# Patient Record
Sex: FEMALE | Race: WHITE | NOT HISPANIC OR LATINO | Employment: OTHER | URBAN - METROPOLITAN AREA
[De-identification: names, ages, dates, MRNs, and addresses within clinical notes are randomized per-mention and may not be internally consistent; named-entity substitution may affect disease eponyms.]

---

## 2023-10-30 ENCOUNTER — HOSPITAL ENCOUNTER (EMERGENCY)
Facility: HOSPITAL | Age: 83
Discharge: HOME | End: 2023-10-30
Attending: EMERGENCY MEDICINE
Payer: MEDICARE

## 2023-10-30 ENCOUNTER — APPOINTMENT (OUTPATIENT)
Dept: RADIOLOGY | Facility: HOSPITAL | Age: 83
End: 2023-10-30
Payer: MEDICARE

## 2023-10-30 VITALS
BODY MASS INDEX: 23.7 KG/M2 | TEMPERATURE: 98.2 F | RESPIRATION RATE: 18 BRPM | WEIGHT: 151 LBS | OXYGEN SATURATION: 94 % | HEIGHT: 67 IN | DIASTOLIC BLOOD PRESSURE: 102 MMHG | SYSTOLIC BLOOD PRESSURE: 149 MMHG | HEART RATE: 91 BPM

## 2023-10-30 DIAGNOSIS — U07.1 COVID: Primary | ICD-10-CM

## 2023-10-30 LAB
ALBUMIN SERPL BCP-MCNC: 3.6 G/DL (ref 3.4–5)
ALP SERPL-CCNC: 74 U/L (ref 33–136)
ALT SERPL W P-5'-P-CCNC: 6 U/L (ref 7–45)
ANION GAP SERPL CALC-SCNC: 13 MMOL/L (ref 10–20)
AST SERPL W P-5'-P-CCNC: 15 U/L (ref 9–39)
BASOPHILS # BLD AUTO: 0.04 X10*3/UL (ref 0–0.1)
BASOPHILS NFR BLD AUTO: 0.7 %
BILIRUB SERPL-MCNC: 1.1 MG/DL (ref 0–1.2)
BUN SERPL-MCNC: 12 MG/DL (ref 6–23)
CALCIUM SERPL-MCNC: 8.8 MG/DL (ref 8.6–10.3)
CHLORIDE SERPL-SCNC: 102 MMOL/L (ref 98–107)
CO2 SERPL-SCNC: 26 MMOL/L (ref 21–32)
CREAT SERPL-MCNC: 0.97 MG/DL (ref 0.5–1.05)
EOSINOPHIL # BLD AUTO: 0.02 X10*3/UL (ref 0–0.4)
EOSINOPHIL NFR BLD AUTO: 0.3 %
ERYTHROCYTE [DISTWIDTH] IN BLOOD BY AUTOMATED COUNT: 14.2 % (ref 11.5–14.5)
FLUAV RNA RESP QL NAA+PROBE: NOT DETECTED
FLUBV RNA RESP QL NAA+PROBE: NOT DETECTED
GFR SERPL CREATININE-BSD FRML MDRD: 57 ML/MIN/1.73M*2
GLUCOSE SERPL-MCNC: 94 MG/DL (ref 74–99)
HCT VFR BLD AUTO: 41.2 % (ref 36–46)
HGB BLD-MCNC: 13 G/DL (ref 12–16)
IMM GRANULOCYTES # BLD AUTO: 0.01 X10*3/UL (ref 0–0.5)
IMM GRANULOCYTES NFR BLD AUTO: 0.2 % (ref 0–0.9)
LACTATE SERPL-SCNC: 1.1 MMOL/L (ref 0.4–2)
LYMPHOCYTES # BLD AUTO: 1.68 X10*3/UL (ref 0.8–3)
LYMPHOCYTES NFR BLD AUTO: 28 %
MCH RBC QN AUTO: 28.6 PG (ref 26–34)
MCHC RBC AUTO-ENTMCNC: 31.6 G/DL (ref 32–36)
MCV RBC AUTO: 91 FL (ref 80–100)
MONOCYTES # BLD AUTO: 0.65 X10*3/UL (ref 0.05–0.8)
MONOCYTES NFR BLD AUTO: 10.8 %
NEUTROPHILS # BLD AUTO: 3.61 X10*3/UL (ref 1.6–5.5)
NEUTROPHILS NFR BLD AUTO: 60 %
NRBC BLD-RTO: 0 /100 WBCS (ref 0–0)
PLATELET # BLD AUTO: 214 X10*3/UL (ref 150–450)
PMV BLD AUTO: 9.1 FL (ref 7.5–11.5)
POTASSIUM SERPL-SCNC: 4.3 MMOL/L (ref 3.5–5.3)
PROT SERPL-MCNC: 8 G/DL (ref 6.4–8.2)
RBC # BLD AUTO: 4.55 X10*6/UL (ref 4–5.2)
SARS-COV-2 RNA RESP QL NAA+PROBE: DETECTED
SODIUM SERPL-SCNC: 137 MMOL/L (ref 136–145)
WBC # BLD AUTO: 6 X10*3/UL (ref 4.4–11.3)

## 2023-10-30 PROCEDURE — 99283 EMERGENCY DEPT VISIT LOW MDM: CPT | Mod: 25

## 2023-10-30 PROCEDURE — 83605 ASSAY OF LACTIC ACID: CPT | Performed by: NURSE PRACTITIONER

## 2023-10-30 PROCEDURE — 36415 COLL VENOUS BLD VENIPUNCTURE: CPT | Performed by: NURSE PRACTITIONER

## 2023-10-30 PROCEDURE — 87636 SARSCOV2 & INF A&B AMP PRB: CPT | Performed by: NURSE PRACTITIONER

## 2023-10-30 PROCEDURE — 85025 COMPLETE CBC W/AUTO DIFF WBC: CPT | Performed by: NURSE PRACTITIONER

## 2023-10-30 PROCEDURE — 80053 COMPREHEN METABOLIC PANEL: CPT | Performed by: NURSE PRACTITIONER

## 2023-10-30 PROCEDURE — 71046 X-RAY EXAM CHEST 2 VIEWS: CPT | Mod: FOREIGN READ | Performed by: RADIOLOGY

## 2023-10-30 PROCEDURE — 71046 X-RAY EXAM CHEST 2 VIEWS: CPT | Mod: FY,FR

## 2023-10-30 PROCEDURE — 99284 EMERGENCY DEPT VISIT MOD MDM: CPT | Mod: 25 | Performed by: EMERGENCY MEDICINE

## 2023-10-30 ASSESSMENT — LIFESTYLE VARIABLES
HAVE PEOPLE ANNOYED YOU BY CRITICIZING YOUR DRINKING: NO
EVER HAD A DRINK FIRST THING IN THE MORNING TO STEADY YOUR NERVES TO GET RID OF A HANGOVER: NO
REASON UNABLE TO ASSESS: NO
EVER FELT BAD OR GUILTY ABOUT YOUR DRINKING: NO
HAVE YOU EVER FELT YOU SHOULD CUT DOWN ON YOUR DRINKING: NO

## 2023-10-30 ASSESSMENT — COLUMBIA-SUICIDE SEVERITY RATING SCALE - C-SSRS
6. HAVE YOU EVER DONE ANYTHING, STARTED TO DO ANYTHING, OR PREPARED TO DO ANYTHING TO END YOUR LIFE?: NO
1. IN THE PAST MONTH, HAVE YOU WISHED YOU WERE DEAD OR WISHED YOU COULD GO TO SLEEP AND NOT WAKE UP?: NO
2. HAVE YOU ACTUALLY HAD ANY THOUGHTS OF KILLING YOURSELF?: NO

## 2023-10-30 ASSESSMENT — PAIN - FUNCTIONAL ASSESSMENT: PAIN_FUNCTIONAL_ASSESSMENT: 0-10

## 2023-10-30 NOTE — Clinical Note
Discharge instructions given to pt's son with verbal understanding. Pt assisted into w/c and taken to car.
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
## 2023-12-23 ENCOUNTER — HOSPITAL ENCOUNTER (INPATIENT)
Facility: HOSPITAL | Age: 83
LOS: 1 days | Discharge: NURSING, REHAB OR LONG-TERM CARE | DRG: 299 | End: 2023-12-24
Attending: EMERGENCY MEDICINE | Admitting: INTERNAL MEDICINE
Payer: MEDICARE

## 2023-12-23 ENCOUNTER — APPOINTMENT (OUTPATIENT)
Dept: RADIOLOGY | Facility: HOSPITAL | Age: 83
DRG: 299 | End: 2023-12-23
Payer: MEDICARE

## 2023-12-23 DIAGNOSIS — I74.10 AORTIC THROMBUS (MULTI): Primary | ICD-10-CM

## 2023-12-23 DIAGNOSIS — I26.99 ACUTE PULMONARY EMBOLISM, UNSPECIFIED PULMONARY EMBOLISM TYPE, UNSPECIFIED WHETHER ACUTE COR PULMONALE PRESENT (MULTI): ICD-10-CM

## 2023-12-23 DIAGNOSIS — I71.40 ABDOMINAL AORTIC ANEURYSM (AAA) WITHOUT RUPTURE, UNSPECIFIED PART (CMS-HCC): ICD-10-CM

## 2023-12-23 DIAGNOSIS — F03.90 DEMENTIA, UNSPECIFIED DEMENTIA SEVERITY, UNSPECIFIED DEMENTIA TYPE, UNSPECIFIED WHETHER BEHAVIORAL, PSYCHOTIC, OR MOOD DISTURBANCE OR ANXIETY (MULTI): ICD-10-CM

## 2023-12-23 DIAGNOSIS — N30.00 ACUTE CYSTITIS WITHOUT HEMATURIA: ICD-10-CM

## 2023-12-23 DIAGNOSIS — N39.0 URINARY TRACT INFECTION IN FEMALE: ICD-10-CM

## 2023-12-23 LAB
ALBUMIN SERPL BCP-MCNC: 3.4 G/DL (ref 3.4–5)
ALP SERPL-CCNC: 67 U/L (ref 33–136)
ALT SERPL W P-5'-P-CCNC: 5 U/L (ref 7–45)
ANION GAP SERPL CALC-SCNC: 13 MMOL/L (ref 10–20)
APPEARANCE UR: ABNORMAL
APTT PPP: 36 SECONDS (ref 27–38)
AST SERPL W P-5'-P-CCNC: 13 U/L (ref 9–39)
BACTERIA #/AREA URNS AUTO: ABNORMAL /HPF
BASOPHILS # BLD AUTO: 0.08 X10*3/UL (ref 0–0.1)
BASOPHILS NFR BLD AUTO: 0.8 %
BILIRUB SERPL-MCNC: 1.1 MG/DL (ref 0–1.2)
BILIRUB UR STRIP.AUTO-MCNC: NEGATIVE MG/DL
BUN SERPL-MCNC: 17 MG/DL (ref 6–23)
CALCIUM SERPL-MCNC: 8.9 MG/DL (ref 8.6–10.3)
CHLORIDE SERPL-SCNC: 104 MMOL/L (ref 98–107)
CO2 SERPL-SCNC: 23 MMOL/L (ref 21–32)
COLOR UR: YELLOW
CREAT SERPL-MCNC: 0.92 MG/DL (ref 0.5–1.05)
EOSINOPHIL # BLD AUTO: 0.44 X10*3/UL (ref 0–0.4)
EOSINOPHIL NFR BLD AUTO: 4.4 %
ERYTHROCYTE [DISTWIDTH] IN BLOOD BY AUTOMATED COUNT: 13.9 % (ref 11.5–14.5)
GFR SERPL CREATININE-BSD FRML MDRD: 61 ML/MIN/1.73M*2
GLUCOSE SERPL-MCNC: 105 MG/DL (ref 74–99)
GLUCOSE UR STRIP.AUTO-MCNC: NEGATIVE MG/DL
HCT VFR BLD AUTO: 40.1 % (ref 36–46)
HGB BLD-MCNC: 12.7 G/DL (ref 12–16)
IMM GRANULOCYTES # BLD AUTO: 0.04 X10*3/UL (ref 0–0.5)
IMM GRANULOCYTES NFR BLD AUTO: 0.4 % (ref 0–0.9)
INR PPP: 1.2 (ref 0.9–1.1)
KETONES UR STRIP.AUTO-MCNC: NEGATIVE MG/DL
LACTATE SERPL-SCNC: 1.4 MMOL/L (ref 0.4–2)
LACTATE SERPL-SCNC: 2.3 MMOL/L (ref 0.4–2)
LEUKOCYTE ESTERASE UR QL STRIP.AUTO: ABNORMAL
LIPASE SERPL-CCNC: 8 U/L (ref 9–82)
LYMPHOCYTES # BLD AUTO: 1.81 X10*3/UL (ref 0.8–3)
LYMPHOCYTES NFR BLD AUTO: 18 %
MCH RBC QN AUTO: 28.5 PG (ref 26–34)
MCHC RBC AUTO-ENTMCNC: 31.7 G/DL (ref 32–36)
MCV RBC AUTO: 90 FL (ref 80–100)
MONOCYTES # BLD AUTO: 0.59 X10*3/UL (ref 0.05–0.8)
MONOCYTES NFR BLD AUTO: 5.9 %
NEUTROPHILS # BLD AUTO: 7.09 X10*3/UL (ref 1.6–5.5)
NEUTROPHILS NFR BLD AUTO: 70.5 %
NITRITE UR QL STRIP.AUTO: POSITIVE
NRBC BLD-RTO: 0 /100 WBCS (ref 0–0)
PH UR STRIP.AUTO: 5 [PH]
PLATELET # BLD AUTO: 306 X10*3/UL (ref 150–450)
POTASSIUM SERPL-SCNC: 4.1 MMOL/L (ref 3.5–5.3)
PROT SERPL-MCNC: 7.3 G/DL (ref 6.4–8.2)
PROT UR STRIP.AUTO-MCNC: ABNORMAL MG/DL
PROTHROMBIN TIME: 13.4 SECONDS (ref 9.8–12.8)
RBC # BLD AUTO: 4.46 X10*6/UL (ref 4–5.2)
RBC # UR STRIP.AUTO: ABNORMAL /UL
RBC #/AREA URNS AUTO: >20 /HPF
SODIUM SERPL-SCNC: 136 MMOL/L (ref 136–145)
SP GR UR STRIP.AUTO: 1.03
SQUAMOUS #/AREA URNS AUTO: ABNORMAL /HPF
UFH PPP CHRO-ACNC: 0.2 IU/ML
UROBILINOGEN UR STRIP.AUTO-MCNC: 2 MG/DL
WBC # BLD AUTO: 10.1 X10*3/UL (ref 4.4–11.3)
WBC #/AREA URNS AUTO: >50 /HPF

## 2023-12-23 PROCEDURE — 1210000001 HC SEMI-PRIVATE ROOM DAILY

## 2023-12-23 PROCEDURE — 96375 TX/PRO/DX INJ NEW DRUG ADDON: CPT

## 2023-12-23 PROCEDURE — 81001 URINALYSIS AUTO W/SCOPE: CPT | Performed by: NURSE PRACTITIONER

## 2023-12-23 PROCEDURE — 83690 ASSAY OF LIPASE: CPT | Performed by: NURSE PRACTITIONER

## 2023-12-23 PROCEDURE — 99285 EMERGENCY DEPT VISIT HI MDM: CPT | Performed by: EMERGENCY MEDICINE

## 2023-12-23 PROCEDURE — 2550000001 HC RX 255 CONTRASTS: Performed by: NURSE PRACTITIONER

## 2023-12-23 PROCEDURE — 96365 THER/PROPH/DIAG IV INF INIT: CPT

## 2023-12-23 PROCEDURE — 87186 SC STD MICRODIL/AGAR DIL: CPT | Mod: GEALAB | Performed by: NURSE PRACTITIONER

## 2023-12-23 PROCEDURE — 71275 CT ANGIOGRAPHY CHEST: CPT | Performed by: RADIOLOGY

## 2023-12-23 PROCEDURE — 74177 CT ABD & PELVIS W/CONTRAST: CPT

## 2023-12-23 PROCEDURE — 74177 CT ABD & PELVIS W/CONTRAST: CPT | Performed by: RADIOLOGY

## 2023-12-23 PROCEDURE — 71275 CT ANGIOGRAPHY CHEST: CPT

## 2023-12-23 PROCEDURE — 85025 COMPLETE CBC W/AUTO DIFF WBC: CPT | Performed by: NURSE PRACTITIONER

## 2023-12-23 PROCEDURE — 85610 PROTHROMBIN TIME: CPT

## 2023-12-23 PROCEDURE — 2500000004 HC RX 250 GENERAL PHARMACY W/ HCPCS (ALT 636 FOR OP/ED): Performed by: NURSE PRACTITIONER

## 2023-12-23 PROCEDURE — 85520 HEPARIN ASSAY: CPT

## 2023-12-23 PROCEDURE — 36415 COLL VENOUS BLD VENIPUNCTURE: CPT | Performed by: NURSE PRACTITIONER

## 2023-12-23 PROCEDURE — 2500000001 HC RX 250 WO HCPCS SELF ADMINISTERED DRUGS (ALT 637 FOR MEDICARE OP)

## 2023-12-23 PROCEDURE — 2500000004 HC RX 250 GENERAL PHARMACY W/ HCPCS (ALT 636 FOR OP/ED)

## 2023-12-23 PROCEDURE — C9113 INJ PANTOPRAZOLE SODIUM, VIA: HCPCS | Performed by: NURSE PRACTITIONER

## 2023-12-23 PROCEDURE — 85730 THROMBOPLASTIN TIME PARTIAL: CPT

## 2023-12-23 PROCEDURE — 80053 COMPREHEN METABOLIC PANEL: CPT | Performed by: NURSE PRACTITIONER

## 2023-12-23 PROCEDURE — 2550000001 HC RX 255 CONTRASTS: Performed by: EMERGENCY MEDICINE

## 2023-12-23 PROCEDURE — 83605 ASSAY OF LACTIC ACID: CPT | Performed by: NURSE PRACTITIONER

## 2023-12-23 PROCEDURE — 87086 URINE CULTURE/COLONY COUNT: CPT | Performed by: NURSE PRACTITIONER

## 2023-12-23 PROCEDURE — 74174 CTA ABD&PLVS W/CONTRAST: CPT | Performed by: RADIOLOGY

## 2023-12-23 RX ORDER — CEFTRIAXONE 1 G/50ML
1 INJECTION, SOLUTION INTRAVENOUS EVERY 24 HOURS
Status: DISCONTINUED | OUTPATIENT
Start: 2023-12-23 | End: 2023-12-24 | Stop reason: HOSPADM

## 2023-12-23 RX ORDER — ACETAMINOPHEN 325 MG/1
650 TABLET ORAL EVERY 8 HOURS
Status: DISCONTINUED | OUTPATIENT
Start: 2023-12-23 | End: 2023-12-24 | Stop reason: HOSPADM

## 2023-12-23 RX ORDER — PANTOPRAZOLE SODIUM 40 MG/10ML
40 INJECTION, POWDER, LYOPHILIZED, FOR SOLUTION INTRAVENOUS ONCE
Status: COMPLETED | OUTPATIENT
Start: 2023-12-23 | End: 2023-12-23

## 2023-12-23 RX ORDER — HEPARIN SODIUM 10000 [USP'U]/100ML
0-4500 INJECTION, SOLUTION INTRAVENOUS CONTINUOUS
Status: DISCONTINUED | OUTPATIENT
Start: 2023-12-23 | End: 2023-12-24

## 2023-12-23 RX ORDER — HYDRALAZINE HYDROCHLORIDE 20 MG/ML
5 INJECTION INTRAMUSCULAR; INTRAVENOUS ONCE
Status: COMPLETED | OUTPATIENT
Start: 2023-12-23 | End: 2023-12-23

## 2023-12-23 RX ORDER — QUETIAPINE FUMARATE 25 MG/1
12.5 TABLET, FILM COATED ORAL DAILY
COMMUNITY

## 2023-12-23 RX ORDER — CARVEDILOL 3.12 MG/1
6.25 TABLET ORAL
Status: DISCONTINUED | OUTPATIENT
Start: 2023-12-23 | End: 2023-12-24 | Stop reason: HOSPADM

## 2023-12-23 RX ORDER — HEPARIN SODIUM 5000 [USP'U]/ML
2000-4000 INJECTION, SOLUTION INTRAVENOUS; SUBCUTANEOUS EVERY 4 HOURS PRN
Status: DISCONTINUED | OUTPATIENT
Start: 2023-12-23 | End: 2023-12-24

## 2023-12-23 RX ORDER — CIPROFLOXACIN 2 MG/ML
400 INJECTION, SOLUTION INTRAVENOUS ONCE
Status: COMPLETED | OUTPATIENT
Start: 2023-12-23 | End: 2023-12-23

## 2023-12-23 RX ORDER — HEPARIN SODIUM 5000 [USP'U]/ML
80 INJECTION, SOLUTION INTRAVENOUS; SUBCUTANEOUS ONCE
Status: DISCONTINUED | OUTPATIENT
Start: 2023-12-23 | End: 2023-12-23

## 2023-12-23 RX ORDER — HEPARIN SODIUM 10000 [USP'U]/100ML
0-4500 INJECTION, SOLUTION INTRAVENOUS CONTINUOUS
Status: DISCONTINUED | OUTPATIENT
Start: 2023-12-23 | End: 2023-12-23

## 2023-12-23 RX ORDER — LABETALOL HYDROCHLORIDE 5 MG/ML
10 INJECTION, SOLUTION INTRAVENOUS EVERY 6 HOURS PRN
Status: DISCONTINUED | OUTPATIENT
Start: 2023-12-23 | End: 2023-12-24 | Stop reason: HOSPADM

## 2023-12-23 RX ORDER — POLYETHYLENE GLYCOL 3350 17 G/17G
17 POWDER, FOR SOLUTION ORAL DAILY
Status: DISCONTINUED | OUTPATIENT
Start: 2023-12-23 | End: 2023-12-24 | Stop reason: HOSPADM

## 2023-12-23 RX ORDER — QUETIAPINE FUMARATE 25 MG/1
25 TABLET, FILM COATED ORAL NIGHTLY
COMMUNITY

## 2023-12-23 RX ORDER — QUETIAPINE FUMARATE 25 MG/1
25 TABLET, FILM COATED ORAL NIGHTLY
Status: DISCONTINUED | OUTPATIENT
Start: 2023-12-23 | End: 2023-12-24 | Stop reason: HOSPADM

## 2023-12-23 RX ORDER — QUETIAPINE FUMARATE 25 MG/1
12.5 TABLET, FILM COATED ORAL DAILY
Status: DISCONTINUED | OUTPATIENT
Start: 2023-12-23 | End: 2023-12-24 | Stop reason: HOSPADM

## 2023-12-23 RX ADMIN — CEFTRIAXONE SODIUM 1 G: 1 INJECTION, SOLUTION INTRAVENOUS at 21:15

## 2023-12-23 RX ADMIN — PANTOPRAZOLE SODIUM 40 MG: 40 INJECTION, POWDER, FOR SOLUTION INTRAVENOUS at 10:27

## 2023-12-23 RX ADMIN — CIPROFLOXACIN 400 MG: 2 INJECTION, SOLUTION INTRAVENOUS at 12:42

## 2023-12-23 RX ADMIN — IOHEXOL 75 ML: 350 INJECTION, SOLUTION INTRAVENOUS at 14:20

## 2023-12-23 RX ADMIN — CARVEDILOL 6.25 MG: 3.12 TABLET, FILM COATED ORAL at 20:00

## 2023-12-23 RX ADMIN — HEPARIN SODIUM 1233 UNITS/HR: 10000 INJECTION, SOLUTION INTRAVENOUS at 18:45

## 2023-12-23 RX ADMIN — SODIUM CHLORIDE 500 ML: 9 INJECTION, SOLUTION INTRAVENOUS at 10:16

## 2023-12-23 RX ADMIN — QUETIAPINE FUMARATE 25 MG: 25 TABLET ORAL at 20:00

## 2023-12-23 RX ADMIN — HEPARIN SODIUM 1330 UNITS/HR: 10000 INJECTION, SOLUTION INTRAVENOUS at 21:12

## 2023-12-23 RX ADMIN — IOHEXOL 75 ML: 350 INJECTION, SOLUTION INTRAVENOUS at 11:20

## 2023-12-23 RX ADMIN — HEPARIN SODIUM 2000 UNITS: 5000 INJECTION, SOLUTION INTRAVENOUS; SUBCUTANEOUS at 21:12

## 2023-12-23 RX ADMIN — HYDRALAZINE HYDROCHLORIDE 5 MG: 20 INJECTION INTRAMUSCULAR; INTRAVENOUS at 16:08

## 2023-12-23 RX ADMIN — ACETAMINOPHEN 650 MG: 325 TABLET ORAL at 20:00

## 2023-12-23 SDOH — HEALTH STABILITY: PHYSICAL HEALTH: ON AVERAGE, HOW MANY DAYS PER WEEK DO YOU ENGAGE IN MODERATE TO STRENUOUS EXERCISE (LIKE A BRISK WALK)?: 0 DAYS

## 2023-12-23 SDOH — SOCIAL STABILITY: SOCIAL INSECURITY: ABUSE: ADULT

## 2023-12-23 SDOH — SOCIAL STABILITY: SOCIAL INSECURITY: ARE YOU OR HAVE YOU BEEN THREATENED OR ABUSED PHYSICALLY, EMOTIONALLY, OR SEXUALLY BY ANYONE?: NO

## 2023-12-23 SDOH — SOCIAL STABILITY: SOCIAL INSECURITY: DO YOU FEEL ANYONE HAS EXPLOITED OR TAKEN ADVANTAGE OF YOU FINANCIALLY OR OF YOUR PERSONAL PROPERTY?: NO

## 2023-12-23 SDOH — ECONOMIC STABILITY: INCOME INSECURITY: HOW HARD IS IT FOR YOU TO PAY FOR THE VERY BASICS LIKE FOOD, HOUSING, MEDICAL CARE, AND HEATING?: NOT HARD AT ALL

## 2023-12-23 SDOH — SOCIAL STABILITY: SOCIAL INSECURITY: HAS ANYONE EVER THREATENED TO HURT YOUR FAMILY OR YOUR PETS?: NO

## 2023-12-23 SDOH — HEALTH STABILITY: PHYSICAL HEALTH: ON AVERAGE, HOW MANY MINUTES DO YOU ENGAGE IN EXERCISE AT THIS LEVEL?: 0 MIN

## 2023-12-23 SDOH — SOCIAL STABILITY: SOCIAL INSECURITY: ARE THERE ANY APPARENT SIGNS OF INJURIES/BEHAVIORS THAT COULD BE RELATED TO ABUSE/NEGLECT?: NO

## 2023-12-23 SDOH — SOCIAL STABILITY: SOCIAL INSECURITY: DO YOU FEEL UNSAFE GOING BACK TO THE PLACE WHERE YOU ARE LIVING?: NO

## 2023-12-23 SDOH — SOCIAL STABILITY: SOCIAL INSECURITY: WERE YOU ABLE TO COMPLETE ALL THE BEHAVIORAL HEALTH SCREENINGS?: YES

## 2023-12-23 SDOH — ECONOMIC STABILITY: INCOME INSECURITY: IN THE LAST 12 MONTHS, WAS THERE A TIME WHEN YOU WERE NOT ABLE TO PAY THE MORTGAGE OR RENT ON TIME?: NO

## 2023-12-23 SDOH — SOCIAL STABILITY: SOCIAL INSECURITY: HAVE YOU HAD THOUGHTS OF HARMING ANYONE ELSE?: NO

## 2023-12-23 SDOH — SOCIAL STABILITY: SOCIAL INSECURITY: DOES ANYONE TRY TO KEEP YOU FROM HAVING/CONTACTING OTHER FRIENDS OR DOING THINGS OUTSIDE YOUR HOME?: NO

## 2023-12-23 ASSESSMENT — PATIENT HEALTH QUESTIONNAIRE - PHQ9
2. FEELING DOWN, DEPRESSED OR HOPELESS: NOT AT ALL
1. LITTLE INTEREST OR PLEASURE IN DOING THINGS: NOT AT ALL
SUM OF ALL RESPONSES TO PHQ9 QUESTIONS 1 & 2: 0

## 2023-12-23 ASSESSMENT — PAIN - FUNCTIONAL ASSESSMENT
PAIN_FUNCTIONAL_ASSESSMENT: PAINAD (PAIN ASSESSMENT IN ADVANCED DEMENTIA SCALE)
PAIN_FUNCTIONAL_ASSESSMENT: 0-10
PAIN_FUNCTIONAL_ASSESSMENT: PAINAD (PAIN ASSESSMENT IN ADVANCED DEMENTIA SCALE)

## 2023-12-23 ASSESSMENT — COGNITIVE AND FUNCTIONAL STATUS - GENERAL
TURNING FROM BACK TO SIDE WHILE IN FLAT BAD: A LITTLE
MOVING TO AND FROM BED TO CHAIR: A LOT
DRESSING REGULAR UPPER BODY CLOTHING: A LITTLE
DAILY ACTIVITIY SCORE: 16
STANDING UP FROM CHAIR USING ARMS: A LOT
CLIMB 3 TO 5 STEPS WITH RAILING: A LOT
MOVING FROM LYING ON BACK TO SITTING ON SIDE OF FLAT BED WITH BEDRAILS: A LITTLE
PERSONAL GROOMING: A LITTLE
TOILETING: A LOT
WALKING IN HOSPITAL ROOM: A LOT
MOBILITY SCORE: 14
PATIENT BASELINE BEDBOUND: UNABLE TO ASSESS AT THIS TIME
DRESSING REGULAR LOWER BODY CLOTHING: A LITTLE
EATING MEALS: A LITTLE
HELP NEEDED FOR BATHING: A LOT

## 2023-12-23 ASSESSMENT — PAIN SCALES - PAIN ASSESSMENT IN ADVANCED DEMENTIA (PAINAD)
TOTALSCORE: 0
CONSOLABILITY: NO NEED TO CONSOLE
BODYLANGUAGE: RELAXED
FACIALEXPRESSION: SMILING OR INEXPRESSIVE
BREATHING: NORMAL
BODYLANGUAGE: RELAXED
CONSOLABILITY: NO NEED TO CONSOLE
BREATHING: NORMAL

## 2023-12-23 ASSESSMENT — LIFESTYLE VARIABLES
REASON UNABLE TO ASSESS: NO
AUDIT-C TOTAL SCORE: 0
HAVE PEOPLE ANNOYED YOU BY CRITICIZING YOUR DRINKING: NO
HOW OFTEN DO YOU HAVE A DRINK CONTAINING ALCOHOL: NEVER
EVER HAD A DRINK FIRST THING IN THE MORNING TO STEADY YOUR NERVES TO GET RID OF A HANGOVER: NO
HOW MANY STANDARD DRINKS CONTAINING ALCOHOL DO YOU HAVE ON A TYPICAL DAY: PATIENT DOES NOT DRINK
AUDIT-C TOTAL SCORE: 0
HAVE YOU EVER FELT YOU SHOULD CUT DOWN ON YOUR DRINKING: NO
EVER FELT BAD OR GUILTY ABOUT YOUR DRINKING: NO
SKIP TO QUESTIONS 9-10: 1
HOW OFTEN DO YOU HAVE 6 OR MORE DRINKS ON ONE OCCASION: NEVER

## 2023-12-23 ASSESSMENT — ACTIVITIES OF DAILY LIVING (ADL)
LACK_OF_TRANSPORTATION: NO
HEARING - RIGHT EAR: FUNCTIONAL
WALKS IN HOME: NEEDS ASSISTANCE
PATIENT'S MEMORY ADEQUATE TO SAFELY COMPLETE DAILY ACTIVITIES?: NO
JUDGMENT_ADEQUATE_SAFELY_COMPLETE_DAILY_ACTIVITIES: NO
DRESSING YOURSELF: NEEDS ASSISTANCE
ADEQUATE_TO_COMPLETE_ADL: YES
BATHING: NEEDS ASSISTANCE
HEARING - LEFT EAR: FUNCTIONAL
ASSISTIVE_DEVICE: WALKER
GROOMING: NEEDS ASSISTANCE
FEEDING YOURSELF: NEEDS ASSISTANCE
TOILETING: NEEDS ASSISTANCE

## 2023-12-23 ASSESSMENT — COLUMBIA-SUICIDE SEVERITY RATING SCALE - C-SSRS
6. HAVE YOU EVER DONE ANYTHING, STARTED TO DO ANYTHING, OR PREPARED TO DO ANYTHING TO END YOUR LIFE?: NO
2. HAVE YOU ACTUALLY HAD ANY THOUGHTS OF KILLING YOURSELF?: NO
1. IN THE PAST MONTH, HAVE YOU WISHED YOU WERE DEAD OR WISHED YOU COULD GO TO SLEEP AND NOT WAKE UP?: NO

## 2023-12-23 ASSESSMENT — PAIN SCALES - GENERAL: PAINLEVEL_OUTOF10: 0 - NO PAIN

## 2023-12-23 NOTE — H&P
History Of Present Illness  History primarily gathered from Son (present) and her facility (via phone)    Marsha Rangel is a 83 y.o. 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 Patient's pain started in the RLQ and progressively migrated up to her rib cage and wrapped around her back. Has not had associated sypmtoms aside from the progressive weakness that facility notes has been progressing over past couple weeks. Son notes that over the past 7-8 months he's noted a general decline in her strength.      12 point Review of Systems attempted but very limited due to patient dementia.     Past Medical History  No past medical history on file.    Surgical History  No past surgical history on file.     Social History  SheDrug use questions deferred to the physician. No history on file for tobacco use and alcohol use.    Family History  No family history on file.     Allergies  Codeine, Ibuprofen, and Penicillins    Review of Systems   Reason unable to perform ROS: Limited 2/2 Dementia.        Physical Exam  Constitutional:       General: She is not in acute distress.  HENT:      Head: Atraumatic.   Eyes:      Extraocular Movements: Extraocular movements intact.      Conjunctiva/sclera: Conjunctivae normal.   Cardiovascular:      Rate and Rhythm: Normal rate and regular rhythm.   Pulmonary:      Effort: Pulmonary effort is normal.      Breath sounds: No wheezing, rhonchi or rales.   Abdominal:      General: There is no distension.      Tenderness: There is abdominal tenderness. There is no guarding or rebound.   Musculoskeletal:         General: Normal range of motion.      Cervical back: Normal range of motion.   Skin:     General: Skin is warm and dry.   Neurological:      Mental Status: She is alert.      Comments: A&O x1 (to self)          Last Recorded Vitals  Blood pressure (!) 117/99, pulse 99, temperature  "36.6 °C (97.9 °F), resp. rate 17, height 1.651 m (5' 5\"), weight 68.5 kg (151 lb 0.2 oz), SpO2 97 %.    Relevant Results    Scheduled medications  heparin, 80 Units/kg, intravenous, Once  polyethylene glycol, 17 g, oral, Daily      Continuous medications  heparin, 0-4,500 Units/hr      PRN medications  PRN medications: heparin     Results for orders placed or performed during the hospital encounter of 12/23/23 (from the past 24 hour(s))   Lipase   Result Value Ref Range    Lipase 8 (L) 9 - 82 U/L   Lactate   Result Value Ref Range    Lactate 2.3 (H) 0.4 - 2.0 mmol/L   Comprehensive Metabolic Panel   Result Value Ref Range    Glucose 105 (H) 74 - 99 mg/dL    Sodium 136 136 - 145 mmol/L    Potassium 4.1 3.5 - 5.3 mmol/L    Chloride 104 98 - 107 mmol/L    Bicarbonate 23 21 - 32 mmol/L    Anion Gap 13 10 - 20 mmol/L    Urea Nitrogen 17 6 - 23 mg/dL    Creatinine 0.92 0.50 - 1.05 mg/dL    eGFR 61 >60 mL/min/1.73m*2    Calcium 8.9 8.6 - 10.3 mg/dL    Albumin 3.4 3.4 - 5.0 g/dL    Alkaline Phosphatase 67 33 - 136 U/L    Total Protein 7.3 6.4 - 8.2 g/dL    AST 13 9 - 39 U/L    Bilirubin, Total 1.1 0.0 - 1.2 mg/dL    ALT 5 (L) 7 - 45 U/L   CBC and Auto Differential   Result Value Ref Range    WBC 10.1 4.4 - 11.3 x10*3/uL    nRBC 0.0 0.0 - 0.0 /100 WBCs    RBC 4.46 4.00 - 5.20 x10*6/uL    Hemoglobin 12.7 12.0 - 16.0 g/dL    Hematocrit 40.1 36.0 - 46.0 %    MCV 90 80 - 100 fL    MCH 28.5 26.0 - 34.0 pg    MCHC 31.7 (L) 32.0 - 36.0 g/dL    RDW 13.9 11.5 - 14.5 %    Platelets 306 150 - 450 x10*3/uL    Neutrophils % 70.5 40.0 - 80.0 %    Immature Granulocytes %, Automated 0.4 0.0 - 0.9 %    Lymphocytes % 18.0 13.0 - 44.0 %    Monocytes % 5.9 2.0 - 10.0 %    Eosinophils % 4.4 0.0 - 6.0 %    Basophils % 0.8 0.0 - 2.0 %    Neutrophils Absolute 7.09 (H) 1.60 - 5.50 x10*3/uL    Immature Granulocytes Absolute, Automated 0.04 0.00 - 0.50 x10*3/uL    Lymphocytes Absolute 1.81 0.80 - 3.00 x10*3/uL    Monocytes Absolute 0.59 0.05 - 0.80 " x10*3/uL    Eosinophils Absolute 0.44 (H) 0.00 - 0.40 x10*3/uL    Basophils Absolute 0.08 0.00 - 0.10 x10*3/uL   Urinalysis with Reflex Microscopic   Result Value Ref Range    Color, Urine Yellow Straw, Yellow    Appearance, Urine Hazy (N) Clear    Specific Gravity, Urine 1.035 1.005 - 1.035    pH, Urine 5.0 5.0, 5.5, 6.0, 6.5, 7.0, 7.5, 8.0    Protein, Urine 100 (2+) (N) NEGATIVE mg/dL    Glucose, Urine NEGATIVE NEGATIVE mg/dL    Blood, Urine MODERATE (2+) (A) NEGATIVE    Ketones, Urine NEGATIVE NEGATIVE mg/dL    Bilirubin, Urine NEGATIVE NEGATIVE    Urobilinogen, Urine 2.0 (N) <2.0 mg/dL    Nitrite, Urine POSITIVE (A) NEGATIVE    Leukocyte Esterase, Urine LARGE (3+) (A) NEGATIVE   Microscopic Only, Urine   Result Value Ref Range    WBC, Urine >50 (A) 1-5, NONE /HPF    RBC, Urine >20 (A) NONE, 1-2, 3-5 /HPF    Squamous Epithelial Cells, Urine 1-9 (SPARSE) Reference range not established. /HPF    Bacteria, Urine 4+ (A) NONE SEEN /HPF   Lactate   Result Value Ref Range    Lactate 1.4 0.4 - 2.0 mmol/L     CT abdomen pelvis w IV contrast    Result Date: 12/23/2023  Interpreted By:  Christopher Elder, STUDY: CT ABDOMEN PELVIS W IV CONTRAST;  12/23/2023 11:19 am   INDICATION: 82 y/o   F with  Signs/Symptoms:rigid abdomen and pain during palpation.   LIMITATIONS: None.   ACCESSION NUMBER(S): [id number]   ORDERING CLINICIAN: WISAM العراقي   TECHNIQUE: After the administration of   oral water and IV nonionic contrast, spiral axial images were obtained from the xiphoid down through the symphysis pubis. Sagittal and coronal reconstruction images were generated. Bone, mediastinal, lung, and liver windows were reviewed. IV contrast agent was Omnipaque 350, 75 mL.   COMPARISON: None.   FINDINGS: LUNG BASES: Moderate-sized right pleural effusion. Moderate atelectasis posteriorly in the right lower lobe. Tiny left pleural effusion. Scant atelectasis at the posterior left lung base.   There is aneurysmal dilatation of the imaged  distal descending thoracic aorta measuring up to 52 mm in greatest diameter just above the hiatus. The distal end of an endograft stent can be seen ending several cm above the hiatus. There was no identifiable leak of contrast from the imaged portion of the endograft. There is moderate mural thrombus especially distally. The aneurysm ends in the hiatus just above the origin of the celiac.   LIVER: No hepatomegaly. Liver density was  within the limits of normal. There is a 6 mm cyst in segment 4 of the liver on image 23..   GALLBLADDER: Contracted.   BILE DUCTS: No intrahepatic biliary ductal dilatation.  Common bile duct was within the limits of normal.   SPLEEN: No  splenomegaly. No splenic mass..   PANCREAS: No pancreatic mass or inflammation, or ductal dilatation.   KIDNEYS/ADRENALS: No adrenal mass or enlargement. There is a 6 mm cyst in the medial mid to upper pole of the left kidney on axial image 42. Extrarenal pelvis on the right, a variant of normal. No calcified stone, hydronephrosis, mass, or perinephric edema in either kidney. No ureteral stone or dilatation.   BLADDER/PELVIS: Urinary bladder was grossly intact. No uterine enlargement or gross adnexal mass. Mild nonspecific gas in the vagina.   GREAT VESSELS/RETROPERITONEUM: Descending thoracic aortic aneurysm is described above. At the level of the celiac origin, the abdominal aorta measures 29 mm in diameter. There was mild mural plaque or thrombus as well as mural calcification at that level. The remainder of the abdominal aorta was notable for moderate to advanced diffuse mural calcification down through the bifurcation, as well as mild scattered noncalcified mural plaque. There was no abdominal aortic aneurysm or dissection. There were mild mural calcifications in the iliac vessels without aneurysm or dissection. The IVC was intact. No suspicious retroperitoneal adenopathy. No suspicious mesenteric adenopathy. No suspicious pelvic or inguinal  adenopathy.   PERITONEUM: No ascites. No pneumoperitoneum. No peritoneal or mesenteric mass or inflammation.   BOWEL: There is prominence of folds in the cardia of the stomach. This section of stomach however was not well distended. The body and antrum of the stomach was unremarkable. There was no small bowel dilatation or small bowel wall thickening. No small-bowel obstruction. There was moderate to advanced stool in the rectum. Moderate stool and gas in the sigmoid. There was scattered sigmoid diverticulosis. There was mild scattered stool and gas throughout the remainder of the colon. Cecal appendix was intact. There was no colonic wall thickening or adjacent fat stranding.   BONES: No destructive lytic or blastic bone lesion. Grade 1 anterolisthesis of L3 over L4. Vacuum disc phenomenon with mild disc space narrowing and endplate spurring at L4-5 and L5-S1. Mild-to-moderate DJD at L2-3 and mild DJD at L1-2 and L3-4. No lumbar compression fracture. Multilevel interfacet hypertrophy with spur formation. Sclerotic arthritic changes in both SI joints.   ABDOMINAL WALL: Unremarkable.       Aneurysmal dilatation with mural thrombus in the imaged descending thoracic aorta as described. There is an endograft in the descending thoracic aorta ending several cm above the hiatus. There is moderate to advanced mural calcification throughout the abdominal aorta without abdominal aortic aneurysm or dissection. Please see above for details.   Moderate-sized right pleural effusion with moderate associated right lower lobe atelectasis. Tiny left pleural effusion with scant atelectasis at the left lung base.   Contracted gallbladder.   Retained colonic stool and gas as described, most pronounced in the rectum. Mild sigmoid diverticulosis. No CT evidence of large bowel obstruction or inflammation or perforation. No small bowel obstruction.   There is nonspecific wall prominence of the cardia of the stomach. Although this could be  due to artifact and lack of adequate distention of this segment of the stomach, pathology such as gastritis and tumor must be considered. Recommend direct visualization with upper endoscopy to rule out gastric tumor.   Small amount of air in the vagina. Clinical correlation needed.   MACRO: None   Signed by: Christopher Elder 12/23/2023 12:19 PM Dictation workstation:   [id number]        Assessment/Plan   Principal Problem:    Aortic thrombus (CMS/HCC)    84 y/o F admitted for abdominal aortic aneurysm that at this time Vascular Surgery deems no surgical intervention for at this time. They have made blood pressure goal to SBP <130. Additionally, patient found to have PE, was discussed with Vascular and Heparin gtt started.     Active Medical Issues:     #Pulmonary Embolism  - ED notified by Radiology that patient has PE, they discussed with Vascular and patient candidate to start anticoagulation   Plan  - Heparin gtt started in ED, continue on the floor     #Abdominal Aortic Aneurysm  - Past history of TEVAR & Carotid to Subclavian Bypass   - CT A/P demonstrates descending aortic aneurysm 5.6 cm without acute aortic syndrome   - Vascular evaluation stated no indication for acute surgical intervention   - BP goal to target SBP < 140   - Literature demonstrates that Beta Blockers are among best first line therapy for BP mgmt and prevention of AAA expansion   Plan  - Coreg BID started   - Labetalol PRN for SBP > 130   - C/t monitor pressures add antihypertensives as needed   - Scheduled Tylenol  - Vascular Surgery consulted     #UTI  - Patient having weakness, denies any dysuria, confusion limited 2/2 underlying dementia   - Urinalysis Nitrite and Leukocyte Esterase positive   Plan  - Treat with Rocephin     Chronic Medical Issue:  #Dementia - c/w Seroquel         F: PRN  E: PRN  N: Regular Diet   GI ppx: None   DVT ppx: Heparin gtt   Antibiotics: Rocephin   Tubes/Lines/Drains: PIV       Patient admitted for BP management  in setting of AAA, anticoagulation for PE and treatment of UTI. ELOS > 24 hours.       Gee Menard MD  Transitional Year, PGY-1

## 2023-12-23 NOTE — SIGNIFICANT EVENT
Called about patient as part of aortic care team. Patient presents with lower abdominal pain found to have UTI, CTA A/P then CTA C/A/P with descending aortic aneurysm 5.6cm without acute aortic syndrome. Also has prior TEVAR and carotid to subclavian bypass. She can remain at [facility] for treatment of UTI and lower abdominal pain. No indication for acute surgical intervention, would manage medically with antihypertensives to target SBP <140. She should follow-up in 2-3 months with Dr. Friedman, Janeth, or Aly to discuss the descending thoracic aortic aneurysm and possible endovascular treatment options. No contraindication to anticoagulation from aorta perspective.    Discussed with Dr. Friedman.

## 2023-12-23 NOTE — ED PROVIDER NOTES
HPI   Chief Complaint   Patient presents with    Abdominal Pain     BIBS from [facility]-reports abd pain x one week per staff       83-year-old female who has a history of dementia and COPD was sent in by [facility] with concern for severe abdominal pain.  She denies nausea or vomiting.  She can say her name.  She understands she is at a hospital.  Her dementia is categorized as advanced.  She denies fever or chills.  She denies headache.  She denies chest pain.  Patient does not appear to be in acute distress.  Family is bedside.  Her normal orientation is ANO x 2.  She has a long history of incontinence of urine.  Her main caregiver is at [facility].  She was admitted to [facility] for long-term care.  The care facility notes that she has become more agitated at night and there is a strong urine odor.  She was on Keflex.  It is listed that she is allergic to penicillin.  She has been diagnosed with dementia, Parkinson's, malignant melanoma of the skin, ophthalmic migraines, myositis, hyperlipidemia, hyperglycemia, osteoarthritis, chronic obstructive pulmonary disease and cataracts.  She is DNR.      History provided by:  Patient and EMS personnel  History limited by:  Dementia   used: No                        [place] Coma Scale Score: 14                  Patient History   No past medical history on file.  No past surgical history on file.  No family history on file.  Social History     Tobacco Use    Smoking status: Not on file    Smokeless tobacco: Not on file   Vaping Use    Vaping Use: Every day   Substance Use Topics    Alcohol use: Not on file    Drug use: Defer       Physical Exam   ED Triage Vitals   Temp Pulse Resp BP   -- -- -- --      SpO2 Temp src Heart Rate Source Patient Position   -- -- -- --      BP Location FiO2 (%)     -- --       Physical Exam  HENT:      Head: Normocephalic.      Right Ear: Tympanic membrane normal.      Nose: Nose normal.       Mouth/Throat:      Mouth: Mucous membranes are moist.   Eyes:      Pupils: Pupils are equal, round, and reactive to light.   Cardiovascular:      Rate and Rhythm: Normal rate and regular rhythm.   Pulmonary:      Effort: Pulmonary effort is normal.      Breath sounds: Normal breath sounds.   Abdominal:      General: Abdomen is flat.      Tenderness: There is abdominal tenderness.      Comments: Abdomen feels rigid in the lower quadrants   Musculoskeletal:         General: Normal range of motion.      Cervical back: Normal range of motion and neck supple.   Skin:     General: Skin is warm.      Capillary Refill: Capillary refill takes less than 2 seconds.   Neurological:      General: No focal deficit present.      Mental Status: She is alert and oriented to person, place, and time.   Psychiatric:         Mood and Affect: Mood normal.         Behavior: Behavior normal.         ED Course & MDM   Diagnoses as of 12/23/23 1824   Aortic thrombus (CMS/HCC)   Urinary tract infection in female       Medical Decision Making  Patient received half liter normal saline and was made NPO.  She received Protonix.  CT abdomen pelvis ordered with contrast.  We were told to keep patient's blood pressure below a systolic of 130.  I consulted with the aortic team and also vascular at [facility].  Dr. Gema Lazcano was our main contact but he also noted the patient can follow-up with Dr. Friedman in 2 to 3 months for the thrombus of the aorta.  I notified Dr. Lazcano the patient also has a right PE but there is no strain noted.  I updated hospital service to let them know that patient also has a right PE and were starting heparin.  Patient had a moderately sized right pleural effusion and moderate atelectasis.  There was posteriorly in the right lower lobe tiny left pleural effusion.  There is an aneurysm dilation at the distal descending thoracic aorta.  Thrombus was noted and this is what we discussed with the aortic service/vascular service.   We were told that we would keep patient at [facility] to keep her blood pressure under 130, and that they are aware we would start heparin for the PE.  Patient was seen and staffed with attending and admitted to hospital service.  She also has a very large urinary tract infection with positive nitrates +3 leukocytes and over 50 white blood cells.  I covered patient with Cipro.  Urine culture was sent.  Her lactate was normal.    Amount and/or Complexity of Data Reviewed  Labs: ordered.  Radiology: ordered.  ECG/medicine tests: ordered and independent interpretation performed.        Procedure  Procedures     Geovanni Dickens, ANNABEL-CNP  12/23/23 0385

## 2023-12-24 ENCOUNTER — HOME HEALTH ADMISSION (OUTPATIENT)
Dept: HOME HEALTH SERVICES | Facility: HOME HEALTH | Age: 83
End: 2023-12-24
Payer: MEDICARE

## 2023-12-24 ENCOUNTER — DOCUMENTATION (OUTPATIENT)
Dept: HOME HEALTH SERVICES | Facility: HOME HEALTH | Age: 83
End: 2023-12-24

## 2023-12-24 ENCOUNTER — TELEPHONE (OUTPATIENT)
Dept: HOME HEALTH SERVICES | Facility: HOME HEALTH | Age: 83
End: 2023-12-24

## 2023-12-24 VITALS
RESPIRATION RATE: 16 BRPM | WEIGHT: 151.01 LBS | TEMPERATURE: 97.5 F | DIASTOLIC BLOOD PRESSURE: 78 MMHG | HEART RATE: 70 BPM | SYSTOLIC BLOOD PRESSURE: 123 MMHG | BODY MASS INDEX: 25.16 KG/M2 | OXYGEN SATURATION: 94 % | HEIGHT: 65 IN

## 2023-12-24 PROBLEM — N39.0 URINARY TRACT INFECTION IN FEMALE: Status: ACTIVE | Noted: 2023-12-24

## 2023-12-24 LAB
ALBUMIN SERPL BCP-MCNC: 2.9 G/DL (ref 3.4–5)
ANION GAP SERPL CALC-SCNC: 13 MMOL/L (ref 10–20)
BUN SERPL-MCNC: 14 MG/DL (ref 6–23)
CALCIUM SERPL-MCNC: 8.5 MG/DL (ref 8.6–10.3)
CHLORIDE SERPL-SCNC: 107 MMOL/L (ref 98–107)
CO2 SERPL-SCNC: 23 MMOL/L (ref 21–32)
CREAT SERPL-MCNC: 1.04 MG/DL (ref 0.5–1.05)
ERYTHROCYTE [DISTWIDTH] IN BLOOD BY AUTOMATED COUNT: 14.3 % (ref 11.5–14.5)
GFR SERPL CREATININE-BSD FRML MDRD: 53 ML/MIN/1.73M*2
GLUCOSE SERPL-MCNC: 92 MG/DL (ref 74–99)
HCT VFR BLD AUTO: 36.8 % (ref 36–46)
HGB BLD-MCNC: 11.8 G/DL (ref 12–16)
MAGNESIUM SERPL-MCNC: 2.04 MG/DL (ref 1.6–2.4)
MCH RBC QN AUTO: 28.5 PG (ref 26–34)
MCHC RBC AUTO-ENTMCNC: 32.1 G/DL (ref 32–36)
MCV RBC AUTO: 89 FL (ref 80–100)
NRBC BLD-RTO: 0 /100 WBCS (ref 0–0)
PHOSPHATE SERPL-MCNC: 3.9 MG/DL (ref 2.5–4.9)
PLATELET # BLD AUTO: 285 X10*3/UL (ref 150–450)
POTASSIUM SERPL-SCNC: 4.4 MMOL/L (ref 3.5–5.3)
RBC # BLD AUTO: 4.14 X10*6/UL (ref 4–5.2)
SODIUM SERPL-SCNC: 139 MMOL/L (ref 136–145)
UFH PPP CHRO-ACNC: 0.6 IU/ML
UFH PPP CHRO-ACNC: 0.7 IU/ML
WBC # BLD AUTO: 8 X10*3/UL (ref 4.4–11.3)

## 2023-12-24 PROCEDURE — 85520 HEPARIN ASSAY: CPT | Performed by: INTERNAL MEDICINE

## 2023-12-24 PROCEDURE — 36415 COLL VENOUS BLD VENIPUNCTURE: CPT

## 2023-12-24 PROCEDURE — 99236 HOSP IP/OBS SAME DATE HI 85: CPT | Performed by: INTERNAL MEDICINE

## 2023-12-24 PROCEDURE — 85520 HEPARIN ASSAY: CPT

## 2023-12-24 PROCEDURE — 83735 ASSAY OF MAGNESIUM: CPT

## 2023-12-24 PROCEDURE — 2500000004 HC RX 250 GENERAL PHARMACY W/ HCPCS (ALT 636 FOR OP/ED)

## 2023-12-24 PROCEDURE — 85027 COMPLETE CBC AUTOMATED: CPT

## 2023-12-24 PROCEDURE — 2500000001 HC RX 250 WO HCPCS SELF ADMINISTERED DRUGS (ALT 637 FOR MEDICARE OP)

## 2023-12-24 PROCEDURE — 80069 RENAL FUNCTION PANEL: CPT

## 2023-12-24 RX ORDER — CEPHALEXIN 500 MG/1
500 CAPSULE ORAL 2 TIMES DAILY
Qty: 12 CAPSULE | Refills: 0 | Status: SHIPPED | OUTPATIENT
Start: 2023-12-24 | End: 2023-12-30

## 2023-12-24 RX ADMIN — APIXABAN 10 MG: 5 TABLET, FILM COATED ORAL at 10:01

## 2023-12-24 RX ADMIN — CARVEDILOL 6.25 MG: 3.12 TABLET, FILM COATED ORAL at 08:55

## 2023-12-24 RX ADMIN — QUETIAPINE FUMARATE 12.5 MG: 25 TABLET ORAL at 08:56

## 2023-12-24 RX ADMIN — HEPARIN SODIUM 1200 UNITS/HR: 10000 INJECTION, SOLUTION INTRAVENOUS at 08:58

## 2023-12-24 RX ADMIN — POLYETHYLENE GLYCOL 3350 17 G: 17 POWDER, FOR SOLUTION ORAL at 08:55

## 2023-12-24 ASSESSMENT — COGNITIVE AND FUNCTIONAL STATUS - GENERAL
MOVING FROM LYING ON BACK TO SITTING ON SIDE OF FLAT BED WITH BEDRAILS: A LITTLE
DAILY ACTIVITIY SCORE: 15
CLIMB 3 TO 5 STEPS WITH RAILING: A LOT
MOBILITY SCORE: 14
EATING MEALS: A LITTLE
STANDING UP FROM CHAIR USING ARMS: A LOT
DRESSING REGULAR LOWER BODY CLOTHING: A LITTLE
MOVING TO AND FROM BED TO CHAIR: A LOT
PERSONAL GROOMING: A LOT
DRESSING REGULAR UPPER BODY CLOTHING: A LITTLE
TOILETING: A LOT
WALKING IN HOSPITAL ROOM: A LOT
HELP NEEDED FOR BATHING: A LOT
TURNING FROM BACK TO SIDE WHILE IN FLAT BAD: A LITTLE

## 2023-12-24 ASSESSMENT — PAIN SCALES - GENERAL: PAINLEVEL_OUTOF10: 0 - NO PAIN

## 2023-12-24 NOTE — CARE PLAN
Problem: Skin  Goal: Participates in plan/prevention/treatment measures  Outcome: Progressing     Problem: Breathing  Goal: STG - Patient will maintain patent airway  Outcome: Progressing     Problem: Fall/Injury  Goal: Not fall by end of shift  Outcome: Progressing     Problem: Urinary Incontinence  Goal: I will maintain optimum level of urinary continence  Outcome: Progressing      The clinical goals for the shift include  patient will have a therapeutic heparin assay this shift     Over the shift, the patient did not make progress toward the following goals. Barriers to progression include patient has dementia and is very hard of hearing.  Continues to receive heparin via IV. No complaints of shortness of breath noted. Denies abdominal pain for this nurse. Continues to receive IV ATB for UTI. Recommendations to address these barriers include family involvement and reiteration of care .

## 2023-12-24 NOTE — HOSPITAL COURSE
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  Vascular downtown was consulted and stated that there was no acute indication for surgical intervention and was medically managed with antihypertensives to target SBP less than 140 and that the patient can follow-up in 2 to 3 months with Dr. Veloz/Janeth/Ed to discuss the descending thoracic aortic aneurysm and possible endovascular treatment options.  Also stated that there were no contraindications to anticoagulation from an aortic perspective.  Patient was started on heparin drip in the ED.    Admitted onto the floor for management of UTI, lower abdominal pain, and new PE.  While on the floor heparin drip was continued.  Patient was also started on ceftriaxone for possible UTI.  Patient remained stable during entire hospital stay.  On 12/24, patient was transitioned off of heparin drip onto Eliquis. Patient was deemed stable for discharge with appropriate follow-up.    Please take medications as prescribed on medication reconciliation.  -New medications:  -- Eliquis 10 mg twice daily x 7-day  -- Eliquis 5 mg twice daily after 12/31    Please follow-up with:  - PCP  - Vascular surgeon within 2 to 3 months

## 2023-12-24 NOTE — TELEPHONE ENCOUNTER
Hello, thank you for the referral to  home care. Please be advised our first available start of care in the patient's area would be 12/27-12/29. If home care is needed sooner than that, please refer to an alternate agency.      Thank you,   [facility] Intake

## 2023-12-24 NOTE — NURSING NOTE
0700 Care assumed from night shift bedside report complete.  Patient sleeping NAD.  Awaitting dispo    1017 report called to [facility]   1437  patient to [facility] hgpouse with ems

## 2023-12-24 NOTE — PROGRESS NOTES
12/24/23 0852   Discharge Planning   Living Arrangements Alone   Support Systems Children;Family members;Friends/neighbors   Assistance Needed ADL assistance.   Type of Residence Assisted living   Home or Post Acute Services In home services   Type of Home Care Services Home PT;Home OT   Patient expects to be discharged to: [facility] with [facility]   Does the patient need discharge transport arranged? Yes   RoundTrip coordination needed? Yes   Has discharge transport been arranged? No   What day is the transport expected? 12/24/23   Patient Choice   Provider Choice list and CMS website (https://medicare.gov/care-compare#search) for post-acute Quality and Resource Measure Data were provided and reviewed with: Family   Patient / Family choosing to utilize agency / facility established prior to hospitalization Yes     12/24/2023: Patient admitted with weakness, abdominal and UTI. Patient from [facility]. Spoke with son Robert, and would additional PTOT at facility. Choice is [facility]. Physician aware. Patient will need transport set up to return to facility. Facility MD Paulino to follow for [facility].

## 2023-12-24 NOTE — PROGRESS NOTES
12/24/23 @ 1303: Patient medically ready for dc today with HC back to [facility], referral has been placed and [facility] notified [facility] intake today. Transport set up for 1400.

## 2023-12-24 NOTE — HH CARE COORDINATION
Home Care received a Referral for Physical Therapy and Occupational Therapy. We have processed the referral for a Start of Care on 12.27 or 12.28.     If you have any questions or concerns, please feel free to contact us at [phone]. Follow the prompts, enter your five digit zip code, and you will be directed to your care team on EAST 2.

## 2023-12-24 NOTE — DISCHARGE SUMMARY
Discharge Diagnosis  - Acute Pulmonary Embolism  - Abdominal Aortic Aneurysm   - Urinary Tract Infection      Issues Requiring Follow-Up  Abdominal Aortic Aneurysm - Followup with vascular surgery in 2-3 months (Janeth Gramajo or Aly) as recommended by the cardiology fellow consultant regarding this.      Discharge Meds     Your medication list        START taking these medications        Instructions Last Dose Given Next Dose Due   apixaban 5 mg tablet  Commonly known as: Eliquis      Take 2 tablets (10 mg) by mouth 2 times a day for 13 doses.       apixaban 5 mg tablet  Commonly known as: Eliquis  Start taking on: December 31, 2023      Take 1 tablet (5 mg) by mouth 2 times a day. Do not start before December 31, 2023.       cephalexin 500 mg capsule  Commonly known as: Keflex      Take 1 capsule (500 mg) by mouth 2 times a day for 6 days.              CONTINUE taking these medications        Instructions Last Dose Given Next Dose Due   QUEtiapine 25 mg tablet  Commonly known as: SEROquel           QUEtiapine 25 mg tablet  Commonly known as: SEROquel                     Where to Get Your Medications        These medications were sent to [facility]  [id number] [address]      Hours: 9 AM to 5 PM Mon-Fri Phone: [phone]   apixaban 5 mg tablet  apixaban 5 mg tablet  cephalexin 500 mg capsule         Test Results Pending At Discharge  Pending Labs       Order Current Status    Urine culture In process          Hospital Course  Marsha Rangel is an 83-year-old female with past medical history significant for dementia, TEVAR and 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  Vascular downtown was consulted and stated that there was no acute indication for surgical  intervention and was medically managed with antihypertensives to target SBP less than 140 and that the patient can follow-up in 2 to 3 months with Dr. Veloz/Janeth/Ed to discuss the descending thoracic aortic aneurysm and possible endovascular treatment options.  Also stated that there were no contraindications to anticoagulation from an aortic perspective.  Patient was started on heparin drip in the ED.    Admitted onto the floor for management of UTI, lower abdominal pain, and new PE.  While on the floor heparin drip was continued.  Patient was also started on ceftriaxone for possible UTI.  Patient remained stable during entire hospital stay.  On 12/24, patient was transitioned off of heparin drip onto Eliquis. Patient was deemed stable for discharge with appropriate follow-up.    Please take medications as prescribed on medication reconciliation.  -New medications:  -- Eliquis 10 mg twice daily x 7-day  -- Eliquis 5 mg twice daily after 12/31    Please follow-up with:  - PCP  - Vascular surgeon within 2 to 3 months    Pertinent Physical Exam At Time of Discharge  Physical Exam  HENT:      Head: Normocephalic and atraumatic.   Cardiovascular:      Rate and Rhythm: Normal rate and regular rhythm.      Pulses: Normal pulses.      Heart sounds: Normal heart sounds.   Pulmonary:      Effort: Pulmonary effort is normal.      Breath sounds: Normal breath sounds.   Abdominal:      General: Abdomen is flat. Bowel sounds are normal.      Palpations: Abdomen is soft.   Neurological:      Mental Status: Mental status is at baseline. She is disoriented.       Outpatient Follow-Up  No future appointments.    Ty Guzman,

## 2023-12-26 LAB — BACTERIA UR CULT: ABNORMAL

## 2023-12-28 ENCOUNTER — HOME CARE VISIT (OUTPATIENT)
Dept: HOME HEALTH SERVICES | Facility: HOME HEALTH | Age: 83
End: 2023-12-28
Payer: MEDICARE

## 2023-12-28 VITALS — OXYGEN SATURATION: 97 % | DIASTOLIC BLOOD PRESSURE: 60 MMHG | SYSTOLIC BLOOD PRESSURE: 110 MMHG | HEART RATE: 85 BPM

## 2023-12-28 PROCEDURE — 1090000002 HH PPS REVENUE DEBIT

## 2023-12-28 PROCEDURE — 169592 NO-PAY CLAIM PROCEDURE

## 2023-12-28 PROCEDURE — 1090000001 HH PPS REVENUE CREDIT

## 2023-12-28 PROCEDURE — 0023 HH SOC

## 2023-12-28 PROCEDURE — G0152 HHCP-SERV OF OT,EA 15 MIN: HCPCS | Mod: HHH

## 2023-12-28 SDOH — ECONOMIC STABILITY: HOUSING INSECURITY: ASSISTED LIVING: 1

## 2023-12-28 SDOH — ECONOMIC STABILITY: HOUSING INSECURITY: HOME SAFETY: LIVES IN AL

## 2023-12-28 ASSESSMENT — PAIN SCALES - PAIN ASSESSMENT IN ADVANCED DEMENTIA (PAINAD)
FACIALEXPRESSION: 1
BODYLANGUAGE: 1 - TENSE. DISTRESSED PACING. FIDGETING.
BODYLANGUAGE: 1
NEGVOCALIZATION: 0 - NONE.
FACIALEXPRESSION: 1 - SAD. FRIGHTENED. FROWN.
NEGVOCALIZATION: 0
CONSOLABILITY: 0
TOTALSCORE: 2
CONSOLABILITY: 0 - NO NEED TO CONSOLE.
BREATHING: 0

## 2023-12-28 ASSESSMENT — ACTIVITIES OF DAILY LIVING (ADL)
TOILETING: 1
DRESSING_LB_CURRENT_FUNCTION: MAXIMUM ASSIST
FEEDING: STAND BY ASSIST
PHYSICAL_TRANSFERS_DEVICES: ROLLATOR
FEEDING ASSESSED: 1
DRESSING_UB_CURRENT_FUNCTION: MAXIMUM ASSIST
OASIS_M1830: 06
ENTERING_EXITING_HOME: MAXIMUM ASSIST
GROOMING ASSESSED: 1
TOILETING: MAXIMUM ASSIST
BATHING EQUIPMENT USED: SHOWER CHAIR
GROOMING_CURRENT_FUNCTION: MODERATE ASSIST
PHYSICAL TRANSFERS ASSESSED: 1
CURRENT_FUNCTION: MINIMUM ASSIST
BATHING ASSESSED: 1
BATHING_CURRENT_FUNCTION: MAXIMUM ASSIST

## 2023-12-28 ASSESSMENT — ENCOUNTER SYMPTOMS
DENIES PAIN: 1
PERSON REPORTING PAIN: PATIENT

## 2023-12-29 PROCEDURE — 1090000001 HH PPS REVENUE CREDIT

## 2023-12-29 PROCEDURE — 1090000002 HH PPS REVENUE DEBIT

## 2023-12-30 PROCEDURE — 1090000002 HH PPS REVENUE DEBIT

## 2023-12-30 PROCEDURE — 1090000001 HH PPS REVENUE CREDIT

## 2023-12-31 PROCEDURE — 1090000002 HH PPS REVENUE DEBIT

## 2023-12-31 PROCEDURE — 1090000001 HH PPS REVENUE CREDIT

## 2024-01-01 ENCOUNTER — HOME CARE VISIT (OUTPATIENT)
Dept: HOME HEALTH SERVICES | Facility: HOME HEALTH | Age: 84
End: 2024-01-01
Payer: MEDICARE

## 2024-01-01 VITALS — HEART RATE: 86 BPM | RESPIRATION RATE: 18 BRPM | OXYGEN SATURATION: 96 %

## 2024-01-01 PROCEDURE — G0151 HHCP-SERV OF PT,EA 15 MIN: HCPCS | Mod: HHH

## 2024-01-01 PROCEDURE — 1090000002 HH PPS REVENUE DEBIT

## 2024-01-01 PROCEDURE — 1090000001 HH PPS REVENUE CREDIT

## 2024-01-01 ASSESSMENT — ENCOUNTER SYMPTOMS
DENIES PAIN: 1
PERSON REPORTING PAIN: PATIENT

## 2024-01-02 PROCEDURE — 1090000002 HH PPS REVENUE DEBIT

## 2024-01-02 PROCEDURE — 1090000001 HH PPS REVENUE CREDIT

## 2024-01-03 PROCEDURE — 1090000001 HH PPS REVENUE CREDIT

## 2024-01-03 PROCEDURE — 1090000002 HH PPS REVENUE DEBIT

## 2024-01-04 ENCOUNTER — HOME CARE VISIT (OUTPATIENT)
Dept: HOME HEALTH SERVICES | Facility: HOME HEALTH | Age: 84
End: 2024-01-04
Payer: MEDICARE

## 2024-01-04 VITALS
SYSTOLIC BLOOD PRESSURE: 118 MMHG | RESPIRATION RATE: 18 BRPM | DIASTOLIC BLOOD PRESSURE: 78 MMHG | HEART RATE: 82 BPM | OXYGEN SATURATION: 95 %

## 2024-01-04 PROCEDURE — 1090000001 HH PPS REVENUE CREDIT

## 2024-01-04 PROCEDURE — 1090000002 HH PPS REVENUE DEBIT

## 2024-01-04 PROCEDURE — G0152 HHCP-SERV OF OT,EA 15 MIN: HCPCS | Mod: HHH

## 2024-01-05 PROCEDURE — 1090000002 HH PPS REVENUE DEBIT

## 2024-01-05 PROCEDURE — 1090000001 HH PPS REVENUE CREDIT

## 2024-01-06 PROCEDURE — 1090000002 HH PPS REVENUE DEBIT

## 2024-01-06 PROCEDURE — 1090000001 HH PPS REVENUE CREDIT

## 2024-01-07 PROCEDURE — 1090000002 HH PPS REVENUE DEBIT

## 2024-01-07 PROCEDURE — 1090000001 HH PPS REVENUE CREDIT

## 2024-01-08 PROCEDURE — 1090000002 HH PPS REVENUE DEBIT

## 2024-01-08 PROCEDURE — 1090000001 HH PPS REVENUE CREDIT

## 2024-01-09 PROCEDURE — 1090000002 HH PPS REVENUE DEBIT

## 2024-01-09 PROCEDURE — 1090000001 HH PPS REVENUE CREDIT

## 2024-01-10 PROCEDURE — 1090000002 HH PPS REVENUE DEBIT

## 2024-01-10 PROCEDURE — 1090000001 HH PPS REVENUE CREDIT

## 2024-01-11 ENCOUNTER — HOME CARE VISIT (OUTPATIENT)
Dept: HOME HEALTH SERVICES | Facility: HOME HEALTH | Age: 84
End: 2024-01-11
Payer: MEDICARE

## 2024-01-11 PROCEDURE — 1090000002 HH PPS REVENUE DEBIT

## 2024-01-11 PROCEDURE — G0152 HHCP-SERV OF OT,EA 15 MIN: HCPCS | Mod: HHH

## 2024-01-11 PROCEDURE — 1090000001 HH PPS REVENUE CREDIT

## 2024-01-11 ASSESSMENT — ENCOUNTER SYMPTOMS: DENIES PAIN: 1

## 2024-01-12 ENCOUNTER — PHARMACY VISIT (OUTPATIENT)
Dept: PHARMACY | Facility: CLINIC | Age: 84
End: 2024-01-12
Payer: COMMERCIAL

## 2024-01-12 ENCOUNTER — HOSPITAL ENCOUNTER (EMERGENCY)
Facility: HOSPITAL | Age: 84
Discharge: HOME | End: 2024-01-13
Payer: MEDICARE

## 2024-01-12 DIAGNOSIS — N30.00 ACUTE CYSTITIS WITHOUT HEMATURIA: Primary | ICD-10-CM

## 2024-01-12 LAB
ALBUMIN SERPL BCP-MCNC: 3.3 G/DL (ref 3.4–5)
ALP SERPL-CCNC: 76 U/L (ref 33–136)
ALT SERPL W P-5'-P-CCNC: 3 U/L (ref 7–45)
ANION GAP SERPL CALC-SCNC: 16 MMOL/L (ref 10–20)
APPEARANCE UR: CLEAR
AST SERPL W P-5'-P-CCNC: 9 U/L (ref 9–39)
BASOPHILS # BLD AUTO: 0.05 X10*3/UL (ref 0–0.1)
BASOPHILS NFR BLD AUTO: 0.7 %
BILIRUB SERPL-MCNC: 0.9 MG/DL (ref 0–1.2)
BILIRUB UR STRIP.AUTO-MCNC: NEGATIVE MG/DL
BUN SERPL-MCNC: 13 MG/DL (ref 6–23)
CALCIUM SERPL-MCNC: 8.5 MG/DL (ref 8.6–10.3)
CHLORIDE SERPL-SCNC: 102 MMOL/L (ref 98–107)
CO2 SERPL-SCNC: 24 MMOL/L (ref 21–32)
COLOR UR: YELLOW
CREAT SERPL-MCNC: 0.84 MG/DL (ref 0.5–1.05)
EGFRCR SERPLBLD CKD-EPI 2021: 68 ML/MIN/1.73M*2
EOSINOPHIL # BLD AUTO: 0.39 X10*3/UL (ref 0–0.4)
EOSINOPHIL NFR BLD AUTO: 5.8 %
ERYTHROCYTE [DISTWIDTH] IN BLOOD BY AUTOMATED COUNT: 14.6 % (ref 11.5–14.5)
GLUCOSE SERPL-MCNC: 87 MG/DL (ref 74–99)
GLUCOSE UR STRIP.AUTO-MCNC: NEGATIVE MG/DL
HCT VFR BLD AUTO: 40.6 % (ref 36–46)
HGB BLD-MCNC: 12.8 G/DL (ref 12–16)
IMM GRANULOCYTES # BLD AUTO: 0.02 X10*3/UL (ref 0–0.5)
IMM GRANULOCYTES NFR BLD AUTO: 0.3 % (ref 0–0.9)
KETONES UR STRIP.AUTO-MCNC: NEGATIVE MG/DL
LEUKOCYTE ESTERASE UR QL STRIP.AUTO: NEGATIVE
LYMPHOCYTES # BLD AUTO: 1.81 X10*3/UL (ref 0.8–3)
LYMPHOCYTES NFR BLD AUTO: 26.9 %
MCH RBC QN AUTO: 28.6 PG (ref 26–34)
MCHC RBC AUTO-ENTMCNC: 31.5 G/DL (ref 32–36)
MCV RBC AUTO: 91 FL (ref 80–100)
MONOCYTES # BLD AUTO: 0.44 X10*3/UL (ref 0.05–0.8)
MONOCYTES NFR BLD AUTO: 6.5 %
NEUTROPHILS # BLD AUTO: 4.03 X10*3/UL (ref 1.6–5.5)
NEUTROPHILS NFR BLD AUTO: 59.8 %
NITRITE UR QL STRIP.AUTO: NEGATIVE
NRBC BLD-RTO: 0 /100 WBCS (ref 0–0)
PH UR STRIP.AUTO: 6 [PH]
PLATELET # BLD AUTO: 296 X10*3/UL (ref 150–450)
POTASSIUM SERPL-SCNC: 4 MMOL/L (ref 3.5–5.3)
PROT SERPL-MCNC: 7.5 G/DL (ref 6.4–8.2)
PROT UR STRIP.AUTO-MCNC: NEGATIVE MG/DL
RBC # BLD AUTO: 4.48 X10*6/UL (ref 4–5.2)
RBC # UR STRIP.AUTO: NEGATIVE /UL
SODIUM SERPL-SCNC: 138 MMOL/L (ref 136–145)
SP GR UR STRIP.AUTO: 1.01
UROBILINOGEN UR STRIP.AUTO-MCNC: <2 MG/DL
WBC # BLD AUTO: 6.7 X10*3/UL (ref 4.4–11.3)

## 2024-01-12 PROCEDURE — 96366 THER/PROPH/DIAG IV INF ADDON: CPT

## 2024-01-12 PROCEDURE — 81003 URINALYSIS AUTO W/O SCOPE: CPT

## 2024-01-12 PROCEDURE — 2500000004 HC RX 250 GENERAL PHARMACY W/ HCPCS (ALT 636 FOR OP/ED)

## 2024-01-12 PROCEDURE — RXMED WILLOW AMBULATORY MEDICATION CHARGE

## 2024-01-12 PROCEDURE — 96365 THER/PROPH/DIAG IV INF INIT: CPT

## 2024-01-12 PROCEDURE — 85025 COMPLETE CBC W/AUTO DIFF WBC: CPT

## 2024-01-12 PROCEDURE — 80053 COMPREHEN METABOLIC PANEL: CPT

## 2024-01-12 PROCEDURE — 99284 EMERGENCY DEPT VISIT MOD MDM: CPT

## 2024-01-12 PROCEDURE — 36415 COLL VENOUS BLD VENIPUNCTURE: CPT

## 2024-01-12 PROCEDURE — 1090000001 HH PPS REVENUE CREDIT

## 2024-01-12 PROCEDURE — 1090000002 HH PPS REVENUE DEBIT

## 2024-01-12 RX ORDER — LINEZOLID 2 MG/ML
600 INJECTION, SOLUTION INTRAVENOUS EVERY 12 HOURS
Status: DISCONTINUED | OUTPATIENT
Start: 2024-01-12 | End: 2024-01-13 | Stop reason: HOSPADM

## 2024-01-12 RX ORDER — LINEZOLID 600 MG/1
600 TABLET, FILM COATED ORAL 2 TIMES DAILY
Qty: 20 TABLET | Refills: 0 | Status: SHIPPED | OUTPATIENT
Start: 2024-01-12 | End: 2024-01-22

## 2024-01-12 RX ADMIN — LINEZOLID 600 MG: 600 INJECTION, SOLUTION INTRAVENOUS at 16:09

## 2024-01-12 ASSESSMENT — LIFESTYLE VARIABLES
HAVE YOU EVER FELT YOU SHOULD CUT DOWN ON YOUR DRINKING: NO
REASON UNABLE TO ASSESS: NO
EVER FELT BAD OR GUILTY ABOUT YOUR DRINKING: NO
EVER HAD A DRINK FIRST THING IN THE MORNING TO STEADY YOUR NERVES TO GET RID OF A HANGOVER: NO
HAVE PEOPLE ANNOYED YOU BY CRITICIZING YOUR DRINKING: NO

## 2024-01-12 ASSESSMENT — COLUMBIA-SUICIDE SEVERITY RATING SCALE - C-SSRS
2. HAVE YOU ACTUALLY HAD ANY THOUGHTS OF KILLING YOURSELF?: NO
6. HAVE YOU EVER DONE ANYTHING, STARTED TO DO ANYTHING, OR PREPARED TO DO ANYTHING TO END YOUR LIFE?: NO
1. IN THE PAST MONTH, HAVE YOU WISHED YOU WERE DEAD OR WISHED YOU COULD GO TO SLEEP AND NOT WAKE UP?: NO

## 2024-01-12 ASSESSMENT — PAIN DESCRIPTION - LOCATION: LOCATION: BUTTOCKS

## 2024-01-12 ASSESSMENT — PAIN SCALES - GENERAL: PAINLEVEL_OUTOF10: 3

## 2024-01-12 ASSESSMENT — PAIN - FUNCTIONAL ASSESSMENT: PAIN_FUNCTIONAL_ASSESSMENT: 0-10

## 2024-01-13 VITALS
OXYGEN SATURATION: 99 % | RESPIRATION RATE: 16 BRPM | DIASTOLIC BLOOD PRESSURE: 75 MMHG | WEIGHT: 140 LBS | HEIGHT: 65 IN | TEMPERATURE: 97.9 F | HEART RATE: 74 BPM | BODY MASS INDEX: 23.32 KG/M2 | SYSTOLIC BLOOD PRESSURE: 142 MMHG

## 2024-01-13 LAB — HOLD SPECIMEN: NORMAL

## 2024-01-13 PROCEDURE — 1090000002 HH PPS REVENUE DEBIT

## 2024-01-13 PROCEDURE — 1090000001 HH PPS REVENUE CREDIT

## 2024-01-14 PROCEDURE — 1090000002 HH PPS REVENUE DEBIT

## 2024-01-14 PROCEDURE — 1090000001 HH PPS REVENUE CREDIT

## 2024-01-15 PROCEDURE — 1090000002 HH PPS REVENUE DEBIT

## 2024-01-15 PROCEDURE — 1090000001 HH PPS REVENUE CREDIT

## 2024-01-16 PROCEDURE — 1090000001 HH PPS REVENUE CREDIT

## 2024-01-16 PROCEDURE — 1090000002 HH PPS REVENUE DEBIT

## 2024-01-17 PROCEDURE — 1090000001 HH PPS REVENUE CREDIT

## 2024-01-17 PROCEDURE — 1090000002 HH PPS REVENUE DEBIT

## 2024-01-18 ENCOUNTER — HOME CARE VISIT (OUTPATIENT)
Dept: HOME HEALTH SERVICES | Facility: HOME HEALTH | Age: 84
End: 2024-01-18
Payer: MEDICARE

## 2024-01-18 VITALS — HEART RATE: 72 BPM | OXYGEN SATURATION: 98 %

## 2024-01-18 PROCEDURE — 1090000001 HH PPS REVENUE CREDIT

## 2024-01-18 PROCEDURE — 1090000002 HH PPS REVENUE DEBIT

## 2024-01-18 PROCEDURE — G0152 HHCP-SERV OF OT,EA 15 MIN: HCPCS | Mod: HHH

## 2024-01-18 ASSESSMENT — ENCOUNTER SYMPTOMS
DENIES PAIN: 1
PERSON REPORTING PAIN: PATIENT

## 2024-01-18 ASSESSMENT — ACTIVITIES OF DAILY LIVING (ADL)
HOME_HEALTH_OASIS: 01
OASIS_M1830: 06